# Patient Record
Sex: MALE | Race: WHITE
[De-identification: names, ages, dates, MRNs, and addresses within clinical notes are randomized per-mention and may not be internally consistent; named-entity substitution may affect disease eponyms.]

---

## 2020-10-24 ENCOUNTER — HOSPITAL ENCOUNTER (EMERGENCY)
Dept: HOSPITAL 95 - ER | Age: 45
Discharge: HOME | End: 2020-10-24
Payer: OTHER GOVERNMENT

## 2020-10-24 VITALS — BODY MASS INDEX: 30.06 KG/M2 | HEIGHT: 70 IN | WEIGHT: 209.99 LBS

## 2020-10-24 DIAGNOSIS — G89.29: ICD-10-CM

## 2020-10-24 DIAGNOSIS — R51.9: Primary | ICD-10-CM

## 2020-10-24 DIAGNOSIS — Z79.899: ICD-10-CM

## 2020-10-24 DIAGNOSIS — M54.2: ICD-10-CM

## 2020-11-26 ENCOUNTER — HOSPITAL ENCOUNTER (EMERGENCY)
Dept: HOSPITAL 95 - ER | Age: 45
LOS: 1 days | Discharge: HOME | End: 2020-11-27
Payer: OTHER GOVERNMENT

## 2020-11-26 VITALS — HEIGHT: 70 IN | WEIGHT: 214.99 LBS | BODY MASS INDEX: 30.78 KG/M2

## 2020-11-26 DIAGNOSIS — Z79.899: ICD-10-CM
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
## 2020-11-26 DIAGNOSIS — F41.9: ICD-10-CM

## 2020-11-26 DIAGNOSIS — S39.011A: Primary | ICD-10-CM

## 2020-11-26 LAB
ALBUMIN SERPL BCP-MCNC: 4.1 G/DL (ref 3.4–5)
ALBUMIN/GLOB SERPL: 1.1 {RATIO} (ref 0.8–1.8)
ALT SERPL W P-5'-P-CCNC: 72 U/L (ref 12–78)
ANION GAP SERPL CALCULATED.4IONS-SCNC: 5 MMOL/L (ref 6–16)
AST SERPL W P-5'-P-CCNC: 34 U/L (ref 12–37)
BASOPHILS # BLD AUTO: 0.03 K/MM3 (ref 0–0.23)
BASOPHILS NFR BLD AUTO: 1 % (ref 0–2)
BILIRUB SERPL-MCNC: 0.7 MG/DL (ref 0.1–1)
BUN SERPL-MCNC: 10 MG/DL (ref 8–24)
CALCIUM SERPL-MCNC: 8.8 MG/DL (ref 8.5–10.1)
CHLORIDE SERPL-SCNC: 108 MMOL/L (ref 98–108)
CO2 SERPL-SCNC: 28 MMOL/L (ref 21–32)
CREAT SERPL-MCNC: 0.89 MG/DL (ref 0.6–1.2)
DEPRECATED RDW RBC AUTO: 44.1 FL (ref 35.1–46.3)
EOSINOPHIL # BLD AUTO: 0.15 K/MM3 (ref 0–0.68)
EOSINOPHIL NFR BLD AUTO: 3 % (ref 0–6)
ERYTHROCYTE [DISTWIDTH] IN BLOOD BY AUTOMATED COUNT: 13.6 % (ref 11.7–14.2)
GLOBULIN SER CALC-MCNC: 3.8 G/DL (ref 2.2–4)
GLUCOSE SERPL-MCNC: 132 MG/DL (ref 70–99)
HCT VFR BLD AUTO: 42.2 % (ref 37–53)
HGB BLD-MCNC: 14.2 G/DL (ref 13.5–17.5)
IMM GRANULOCYTES # BLD AUTO: 0.04 K/MM3 (ref 0–0.1)
IMM GRANULOCYTES NFR BLD AUTO: 1 % (ref 0–1)
LYMPHOCYTES # BLD AUTO: 2.11 K/MM3 (ref 0.84–5.2)
LYMPHOCYTES NFR BLD AUTO: 36 % (ref 21–46)
MCHC RBC AUTO-ENTMCNC: 33.6 G/DL (ref 31.5–36.5)
MCV RBC AUTO: 90 FL (ref 80–100)
MONOCYTES # BLD AUTO: 0.43 K/MM3 (ref 0.16–1.47)
MONOCYTES NFR BLD AUTO: 7 % (ref 4–13)
NEUTROPHILS # BLD AUTO: 3.11 K/MM3 (ref 1.96–9.15)
NEUTROPHILS NFR BLD AUTO: 53 % (ref 41–73)
NRBC # BLD AUTO: 0 K/MM3 (ref 0–0.02)
NRBC BLD AUTO-RTO: 0 /100 WBC (ref 0–0.2)
PLATELET # BLD AUTO: 124 K/MM3 (ref 150–400)
POTASSIUM SERPL-SCNC: 3.9 MMOL/L (ref 3.5–5.5)
PROT SERPL-MCNC: 7.9 G/DL (ref 6.4–8.2)
SODIUM SERPL-SCNC: 141 MMOL/L (ref 136–145)

## 2020-11-26 PROCEDURE — A9270 NON-COVERED ITEM OR SERVICE: HCPCS

## 2020-11-27 LAB
PROT UR STRIP-MCNC: (no result) MG/DL
SP GR SPEC: 1.02 (ref 1–1.02)
URN SPEC COLLECT METH UR: (no result)
UROBILINOGEN UR STRIP-MCNC: (no result) MG/DL

## 2020-12-07 ENCOUNTER — HOSPITAL ENCOUNTER (EMERGENCY)
Dept: HOSPITAL 95 - ER | Age: 45
Discharge: HOME | End: 2020-12-07
Payer: OTHER GOVERNMENT

## 2020-12-07 VITALS — HEIGHT: 70 IN | WEIGHT: 214.99 LBS | BODY MASS INDEX: 30.78 KG/M2

## 2020-12-07 DIAGNOSIS — Z79.899: ICD-10-CM

## 2020-12-07 DIAGNOSIS — F41.9: ICD-10-CM

## 2020-12-07 DIAGNOSIS — R07.89: Primary | ICD-10-CM

## 2020-12-07 DIAGNOSIS — Z88.0: ICD-10-CM

## 2020-12-07 DIAGNOSIS — R06.02: ICD-10-CM

## 2020-12-07 LAB
ALBUMIN SERPL BCP-MCNC: 4.2 G/DL (ref 3.4–5)
ALBUMIN/GLOB SERPL: 1.2 {RATIO} (ref 0.8–1.8)
ALT SERPL W P-5'-P-CCNC: 73 U/L (ref 12–78)
ANION GAP SERPL CALCULATED.4IONS-SCNC: 8 MMOL/L (ref 6–16)
AST SERPL W P-5'-P-CCNC: 37 U/L (ref 12–37)
BASOPHILS # BLD AUTO: 0.04 K/MM3 (ref 0–0.23)
BASOPHILS NFR BLD AUTO: 1 % (ref 0–2)
BILIRUB SERPL-MCNC: 0.9 MG/DL (ref 0.1–1)
BUN SERPL-MCNC: 11 MG/DL (ref 8–24)
CALCIUM SERPL-MCNC: 8.6 MG/DL (ref 8.5–10.1)
CHLORIDE SERPL-SCNC: 108 MMOL/L (ref 98–108)
CO2 SERPL-SCNC: 23 MMOL/L (ref 21–32)
CREAT SERPL-MCNC: 0.96 MG/DL (ref 0.6–1.2)
DEPRECATED RDW RBC AUTO: 45.3 FL (ref 35.1–46.3)
EOSINOPHIL # BLD AUTO: 0.15 K/MM3 (ref 0–0.68)
EOSINOPHIL NFR BLD AUTO: 3 % (ref 0–6)
ERYTHROCYTE [DISTWIDTH] IN BLOOD BY AUTOMATED COUNT: 14 % (ref 11.7–14.2)
GLOBULIN SER CALC-MCNC: 3.6 G/DL (ref 2.2–4)
GLUCOSE SERPL-MCNC: 95 MG/DL (ref 70–99)
HCT VFR BLD AUTO: 42.7 % (ref 37–53)
HGB BLD-MCNC: 14.4 G/DL (ref 13.5–17.5)
IMM GRANULOCYTES # BLD AUTO: 0.05 K/MM3 (ref 0–0.1)
IMM GRANULOCYTES NFR BLD AUTO: 1 % (ref 0–1)
LYMPHOCYTES # BLD AUTO: 2.38 K/MM3 (ref 0.84–5.2)
LYMPHOCYTES NFR BLD AUTO: 40 % (ref 21–46)
MCHC RBC AUTO-ENTMCNC: 33.7 G/DL (ref 31.5–36.5)
MCV RBC AUTO: 89 FL (ref 80–100)
MONOCYTES # BLD AUTO: 0.48 K/MM3 (ref 0.16–1.47)
MONOCYTES NFR BLD AUTO: 8 % (ref 4–13)
NEUTROPHILS # BLD AUTO: 2.83 K/MM3 (ref 1.96–9.15)
NEUTROPHILS NFR BLD AUTO: 48 % (ref 41–73)
NRBC # BLD AUTO: 0 K/MM3 (ref 0–0.02)
NRBC BLD AUTO-RTO: 0 /100 WBC (ref 0–0.2)
PLATELET # BLD AUTO: 112 K/MM3 (ref 150–400)
POTASSIUM SERPL-SCNC: 3.8 MMOL/L (ref 3.5–5.5)
PROT SERPL-MCNC: 7.8 G/DL (ref 6.4–8.2)
SODIUM SERPL-SCNC: 139 MMOL/L (ref 136–145)
TROPONIN I SERPL-MCNC: <0.015 NG/ML (ref 0–0.04)

## 2021-09-23 ENCOUNTER — HOSPITAL ENCOUNTER (EMERGENCY)
Dept: HOSPITAL 95 - ER | Age: 46
Discharge: HOME | End: 2021-09-23
Payer: OTHER GOVERNMENT

## 2021-09-23 VITALS — WEIGHT: 214.99 LBS | HEIGHT: 70 IN | BODY MASS INDEX: 30.78 KG/M2

## 2021-09-23 DIAGNOSIS — Z88.0: ICD-10-CM

## 2021-09-23 DIAGNOSIS — S43.402A: ICD-10-CM

## 2021-09-23 DIAGNOSIS — S50.02XA: ICD-10-CM
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
## 2021-09-23 DIAGNOSIS — Z79.899: ICD-10-CM

## 2021-09-23 DIAGNOSIS — S46.912A: Primary | ICD-10-CM

## 2022-04-12 ENCOUNTER — HOSPITAL ENCOUNTER (OUTPATIENT)
Dept: HOSPITAL 95 - ER | Age: 47
Setting detail: OBSERVATION
LOS: 1 days | Discharge: HOME | End: 2022-04-13
Attending: INTERNAL MEDICINE | Admitting: INTERNAL MEDICINE
Payer: OTHER GOVERNMENT

## 2022-04-12 VITALS — HEIGHT: 70 IN | BODY MASS INDEX: 30.71 KG/M2 | WEIGHT: 214.51 LBS

## 2022-04-12 DIAGNOSIS — F41.9: ICD-10-CM

## 2022-04-12 DIAGNOSIS — E78.5: ICD-10-CM

## 2022-04-12 DIAGNOSIS — G47.33: ICD-10-CM

## 2022-04-12 DIAGNOSIS — I10: ICD-10-CM

## 2022-04-12 DIAGNOSIS — Z88.1: ICD-10-CM

## 2022-04-12 DIAGNOSIS — Z87.891: ICD-10-CM

## 2022-04-12 DIAGNOSIS — R07.89: Primary | ICD-10-CM

## 2022-04-12 LAB
ALBUMIN SERPL BCP-MCNC: 4 G/DL (ref 3.4–5)
ALBUMIN/GLOB SERPL: 1.1 {RATIO} (ref 0.8–1.8)
ALT SERPL W P-5'-P-CCNC: 53 U/L (ref 12–78)
ANION GAP SERPL CALCULATED.4IONS-SCNC: 4 MMOL/L (ref 6–16)
AST SERPL W P-5'-P-CCNC: 27 U/L (ref 12–37)
BASOPHILS # BLD AUTO: 0.02 K/MM3 (ref 0–0.23)
BASOPHILS NFR BLD AUTO: 0 % (ref 0–2)
BILIRUB SERPL-MCNC: 0.8 MG/DL (ref 0.1–1)
BUN SERPL-MCNC: 12 MG/DL (ref 8–24)
CALCIUM SERPL-MCNC: 8.6 MG/DL (ref 8.5–10.1)
CHLORIDE SERPL-SCNC: 108 MMOL/L (ref 98–108)
CO2 SERPL-SCNC: 27 MMOL/L (ref 21–32)
CREAT SERPL-MCNC: 0.83 MG/DL (ref 0.6–1.2)
DEPRECATED RDW RBC AUTO: 47.8 FL (ref 35.1–46.3)
EOSINOPHIL # BLD AUTO: 0.12 K/MM3 (ref 0–0.68)
EOSINOPHIL NFR BLD AUTO: 2 % (ref 0–6)
ERYTHROCYTE [DISTWIDTH] IN BLOOD BY AUTOMATED COUNT: 14.6 % (ref 11.7–14.2)
GLOBULIN SER CALC-MCNC: 3.6 G/DL (ref 2.2–4)
GLUCOSE SERPL-MCNC: 99 MG/DL (ref 70–99)
HCT VFR BLD AUTO: 43.2 % (ref 37–53)
HGB BLD-MCNC: 14.7 G/DL (ref 13.5–17.5)
IMM GRANULOCYTES # BLD AUTO: 0.02 K/MM3 (ref 0–0.1)
IMM GRANULOCYTES NFR BLD AUTO: 0 % (ref 0–1)
LYMPHOCYTES # BLD AUTO: 2.07 K/MM3 (ref 0.84–5.2)
LYMPHOCYTES NFR BLD AUTO: 33 % (ref 21–46)
MCHC RBC AUTO-ENTMCNC: 34 G/DL (ref 31.5–36.5)
MCV RBC AUTO: 91 FL (ref 80–100)
MONOCYTES # BLD AUTO: 0.46 K/MM3 (ref 0.16–1.47)
MONOCYTES NFR BLD AUTO: 7 % (ref 4–13)
NEUTROPHILS # BLD AUTO: 3.65 K/MM3 (ref 1.96–9.15)
NEUTROPHILS NFR BLD AUTO: 58 % (ref 41–73)
NRBC # BLD AUTO: 0 K/MM3 (ref 0–0.02)
NRBC BLD AUTO-RTO: 0 /100 WBC (ref 0–0.2)
PLATELET # BLD AUTO: 123 K/MM3 (ref 150–400)
POTASSIUM SERPL-SCNC: 4.1 MMOL/L (ref 3.5–5.5)
PROT SERPL-MCNC: 7.6 G/DL (ref 6.4–8.2)
SODIUM SERPL-SCNC: 139 MMOL/L (ref 136–145)

## 2022-04-12 PROCEDURE — G0378 HOSPITAL OBSERVATION PER HR: HCPCS

## 2022-04-12 PROCEDURE — A9270 NON-COVERED ITEM OR SERVICE: HCPCS

## 2022-04-12 NOTE — NUR
SHIFT SUMMARY
PATIENT ADMITTED FROM ER FOR CHEST PAIN. PATIENT HAS NOT C/O CHEST PAIN
STATING THE NITRO RELIEVED HIS PAIN. PATIENT DID C/O HEADACHE AND MEDICATED
PER MAR. PLAN FOR STRESS TEST TOMORROW 0830. PATIENT INDEPENDENT IN ROOM.
ON RA AND PATIENT STATES HE USES CPAP AT NIGHT. VSS. WILL CONTINUE TO MONITOR.

## 2022-04-13 LAB
CHOLEST SERPL-MCNC: 171 MG/DL (ref 50–200)
CHOLEST/HDLC SERPL: 5.3 {RATIO}
HDLC SERPL-MCNC: 32 MG/DL (ref 39–?)
LDLC SERPL CALC-MCNC: 100 MG/DL (ref 0–110)
LDLC/HDLC SERPL: 3.1 {RATIO}
TRIGL SERPL-MCNC: 195 MG/DL (ref 30–160)
VLDLC SERPL CALC-MCNC: 39 MG/DL (ref 6–32)

## 2022-04-13 NOTE — NUR
NIGHT SHIFT SUMMARY
 
PT HAS BEEN RESTING THROUGHOUT THE NIGHT AFTER PM MEDICATIONS. HE HAS BEEN NPO
SINCE MIDNIGHT FOR STRESS TEST THIS MORNING. PT REPORTS THE HEADACHE HE HAD
DURING DAY SHIFT HAD RESOLVED. NO EPISODES OF CHEST PAIN. PT HAS BEEN
INDEPENDENT IN THE ROOM. NO EVENTS REPORTED ON TELEMETRY. PT ALERT AND
ORIENTED AND COOPERATIVE WITH CARE.

## 2022-04-13 NOTE — NUR
PATIENT HAS BEEN DISCHARGED 1800, ACCOMPANIED BY WIFE. HE WAS PROVIDED WITH
DISCHARGE EDU ON [facility], AND GIVEN PAPERWORK WHICH HE SIGNED. HE HAS NO QUESTIONS
AND WILL FOLLOW UP WIT HIS PRIMARY IN ONE WEEK. IV WAS REMOVED AND HE ATE
DINNER BEFORE LEAVING THE FACILTIY.

## 2022-04-13 NOTE — NUR
RESULTS OF STRESS TEST APPEAR TO BE GOOD. AWAITING THE FINAL GO AHEAD FOR
DISCHARGE. PATIENT WILL BE SENT HOME ON [facility] THERAPY WITH EDU PROVIDED AFTER WE
HAVE APPROVAL TO [facility].

## 2022-08-12 ENCOUNTER — HOSPITAL ENCOUNTER (EMERGENCY)
Dept: HOSPITAL 95 - ER | Age: 47
Discharge: HOME | End: 2022-08-12
Payer: OTHER GOVERNMENT

## 2022-08-12 VITALS — BODY MASS INDEX: 30.78 KG/M2 | HEIGHT: 70 IN | WEIGHT: 214.99 LBS

## 2022-08-12 DIAGNOSIS — Z88.0: ICD-10-CM

## 2022-08-12 DIAGNOSIS — I10: ICD-10-CM

## 2022-08-12 DIAGNOSIS — Z79.899: ICD-10-CM

## 2022-08-12 DIAGNOSIS — Z79.82: ICD-10-CM

## 2022-08-12 DIAGNOSIS — R51.9: Primary | ICD-10-CM

## 2022-08-12 LAB
ALBUMIN SERPL BCP-MCNC: 4.2 G/DL (ref 3.4–5)
ALBUMIN/GLOB SERPL: 1.1 {RATIO} (ref 0.8–1.8)
ALT SERPL W P-5'-P-CCNC: 61 U/L (ref 12–78)
ANION GAP SERPL CALCULATED.4IONS-SCNC: 8 MMOL/L (ref 6–16)
AST SERPL W P-5'-P-CCNC: 40 U/L (ref 12–37)
BASOPHILS # BLD AUTO: 0.02 K/MM3 (ref 0–0.23)
BASOPHILS NFR BLD AUTO: 0 % (ref 0–2)
BILIRUB SERPL-MCNC: 0.9 MG/DL (ref 0.1–1)
BUN SERPL-MCNC: 13 MG/DL (ref 8–24)
CALCIUM SERPL-MCNC: 9.1 MG/DL (ref 8.5–10.1)
CHLORIDE SERPL-SCNC: 106 MMOL/L (ref 98–108)
CO2 SERPL-SCNC: 27 MMOL/L (ref 21–32)
CREAT SERPL-MCNC: 0.8 MG/DL (ref 0.6–1.2)
DEPRECATED RDW RBC AUTO: 47.9 FL (ref 35.1–46.3)
EOSINOPHIL # BLD AUTO: 0.12 K/MM3 (ref 0–0.68)
EOSINOPHIL NFR BLD AUTO: 2 % (ref 0–6)
ERYTHROCYTE [DISTWIDTH] IN BLOOD BY AUTOMATED COUNT: 14.5 % (ref 11.7–14.2)
GLOBULIN SER CALC-MCNC: 3.7 G/DL (ref 2.2–4)
GLUCOSE SERPL-MCNC: 139 MG/DL (ref 70–99)
HCT VFR BLD AUTO: 39.9 % (ref 37–53)
HGB BLD-MCNC: 13.8 G/DL (ref 13.5–17.5)
IMM GRANULOCYTES # BLD AUTO: 0.02 K/MM3 (ref 0–0.1)
IMM GRANULOCYTES NFR BLD AUTO: 0 % (ref 0–1)
LYMPHOCYTES # BLD AUTO: 2.28 K/MM3 (ref 0.84–5.2)
LYMPHOCYTES NFR BLD AUTO: 43 % (ref 21–46)
MCHC RBC AUTO-ENTMCNC: 34.6 G/DL (ref 31.5–36.5)
MCV RBC AUTO: 92 FL (ref 80–100)
MONOCYTES # BLD AUTO: 0.3 K/MM3 (ref 0.16–1.47)
MONOCYTES NFR BLD AUTO: 6 % (ref 4–13)
NEUTROPHILS # BLD AUTO: 2.56 K/MM3 (ref 1.96–9.15)
NEUTROPHILS NFR BLD AUTO: 48 % (ref 41–73)
NRBC # BLD AUTO: 0 K/MM3 (ref 0–0.02)
NRBC BLD AUTO-RTO: 0 /100 WBC (ref 0–0.2)
PLATELET # BLD AUTO: 121 K/MM3 (ref 150–400)
POTASSIUM SERPL-SCNC: 4.2 MMOL/L (ref 3.5–5.5)
PROT SERPL-MCNC: 7.9 G/DL (ref 6.4–8.2)
SODIUM SERPL-SCNC: 141 MMOL/L (ref 136–145)

## 2022-08-12 PROCEDURE — A9270 NON-COVERED ITEM OR SERVICE: HCPCS

## 2023-04-14 ENCOUNTER — HOSPITAL ENCOUNTER (OUTPATIENT)
Dept: HOSPITAL 95 - ORSCMMR | Age: 48
Discharge: HOME | End: 2023-04-14
Attending: SURGERY
Payer: OTHER GOVERNMENT

## 2023-04-14 VITALS — HEIGHT: 70 IN | WEIGHT: 207.9 LBS | BODY MASS INDEX: 29.76 KG/M2

## 2023-04-14 VITALS — DIASTOLIC BLOOD PRESSURE: 81 MMHG | SYSTOLIC BLOOD PRESSURE: 123 MMHG

## 2023-04-14 VITALS — DIASTOLIC BLOOD PRESSURE: 60 MMHG | SYSTOLIC BLOOD PRESSURE: 101 MMHG

## 2023-04-14 VITALS — DIASTOLIC BLOOD PRESSURE: 80 MMHG | SYSTOLIC BLOOD PRESSURE: 143 MMHG

## 2023-04-14 DIAGNOSIS — I10: ICD-10-CM

## 2023-04-14 DIAGNOSIS — F41.8: ICD-10-CM

## 2023-04-14 DIAGNOSIS — Z79.899: ICD-10-CM

## 2023-04-14 DIAGNOSIS — K62.5: Primary | ICD-10-CM

## 2023-04-14 DIAGNOSIS — K64.8: ICD-10-CM

## 2023-04-14 DIAGNOSIS — G47.33: ICD-10-CM

## 2023-04-14 PROCEDURE — 0DJD8ZZ INSPECTION OF LOWER INTESTINAL TRACT, VIA NATURAL OR ARTIFICIAL OPENING ENDOSCOPIC: ICD-10-PCS | Performed by: SURGERY
